# Patient Record
Sex: FEMALE | Race: BLACK OR AFRICAN AMERICAN | NOT HISPANIC OR LATINO | URBAN - METROPOLITAN AREA
[De-identification: names, ages, dates, MRNs, and addresses within clinical notes are randomized per-mention and may not be internally consistent; named-entity substitution may affect disease eponyms.]

---

## 2020-06-01 ENCOUNTER — OFFICE VISIT (OUTPATIENT)
Dept: URBAN - METROPOLITAN AREA CLINIC 86 | Facility: CLINIC | Age: 70
End: 2020-06-01

## 2020-07-25 ENCOUNTER — TELEPHONE ENCOUNTER (OUTPATIENT)
Dept: URBAN - METROPOLITAN AREA CLINIC 92 | Facility: CLINIC | Age: 70
End: 2020-07-25

## 2020-07-25 NOTE — HPI-TODAY'S VISIT:
This is a scheduled follow-up appointment for this patient, a 67 year old /Black female, after a previous visit on Sept 2017, for an evaluation for hepatitis C on treatment evaluation.  Since the last visit, the patient is asymptomatic.      Pt here for follow up. pt had flexogenix inj x 5 in the left knee since the last time we saw her. Pt occ uses the walker. she hasn't seen dr. bonds since the last visit.  she will see   8/23/17 labs wbc 4.0 hgb 13.3 plt 220 cr 0.96 tb 0.9 alp 92 ast 35 alt 20 hcv neg  EGD 6/16/17 normal esophagus bleeding erosive gastropathy  June CT chest  * Final Report *  Reason For Exam ; lymphadenopathy  REPORT PROCEDURE: CT Chest w/o Contrast  CLINICAL INDICATION: Left breast cancer  TECHNIQUE: Non-gated spiral axial images of the chest were obtained without intravenous contrast.  COMPARISON: CT chest dated 5/24/2016, MRI of the abdomen dated 2/23/2017, PET/CT dated 3/14/2014   FINDINGS:  MEDIASTINUM/HEART/VESSELS:  Normal heart size. Trace pericardial effusion. Aorta and main pulmonary artery are normal in course and caliber. Mild calcification of the coronary arteries and aorta. Increase in size of left subpectoral lymph node measuring 1.9 x 1.1 cm, previously 1.6 x 0.7 cm (series 2, image 15). Stable anterior mediastinal lymph node measuring 1.1 cm (series 2, image 25). Decrease in size of right hilar lymph node measuring 1.3 x 1.1 cm, previously 1.8 x 1.6 cm (series 2, image 45). Thyroid is unremarkable.  AIRWAY/LUNGS/PLEURA: Airways are widely patent. Stable right upper lobe groundglass opacity measuring 6 mm (series 2, image 23). No new or enlarging pulmonary nodules. Left lingula linear atelectasis. No pleural effusion or pneumothorax.  VISIBLE ABDOMEN: Multiple stable liver hypodensities, likely cyst or biliary arm at 12 months.. Stable left lower renal pole 2angiomyolipoma, please refer to most recent MRI of the abdomen for further characterization.  SOFT TISSUES/BONES: Status post left partial mastectomy changes with unchanged diffuse skin thickening. Stable left breast soft tissue nodularity with BioSorb marker, measuring 3.2 x 2.6 cm cm (series 2, image 29). No aggressive osseous lesions.   IMPRESSION: 1. Slight increase in size of left subpectoral lymphadenopathy. Attention on follow-up. 2. Stable to decrease in size of mediastinal and hilar lymphadenopathy.  3. Essentially unchanged right upper lobe groundglass opacity, stable since 3/14/2014. No new or enlarging pulmonary nodules.  These images were reviewed and interpreted by Maykel Deluca M.D.   mammogram june 2017   IMPRESSION: Stable area of post-lumpectomy and radiation change in the left breast is probably benign. A unilateral mammogram is recommended in 6 months.    Duration of visit was 25 minutes with over 50% of the time explaining the patient's condition and treatment plan.    Previous note: Pt now 3 months out from tx. she has now achieved svr. she continues to have left arm swelling and pain.  recommend she follow up with dr. bonds in regards to this since she had a LN removed from that side for her breast cancer. she also stopped her bp meds 3 months ago and bp elevated.  recommend she follow up with cardiology today as well.  will need egd with dr. shirley.  labs 5/24/17 wbc 5 hgb 13.7 plt 277 cr 0.96 tb 0.8 alp 101 ast 37 alt 23 hcv not detected   She finished [facility] 2/23/17. She has some elbow discomfort. She is following up for this.  labs 2/2017 neutrophil low 1.3, plt 184, wbc 3.8, cr 0.95, na 145-elevaetd, total bili 1.3, ast 48, alt 29, alk phos 90, hcv not detected, afp 1.3   MRI 2/23/17  Reason For Exam chronic hepatitis  REPORT MRI OF THE ABDOMEN WITHOUT AND WITH CONTRAST  CLINICAL INDICATION: Hepatitis C.  TECHNIQUE: Multisequence, multiplanar MRI of the abdomen was performed with and without intravenous contrast. Informed consent was obtained and there were no immediate complications.   CONTRAST: 10 cc of Multihance  COMPARISON: September 20, 2016 and multiple additional MRI comparisons  FINDINGS: Lung bases are prominently clear. No pleural or pericardial effusion. T2 hyperintense structure in the left breast measuring up to 2.2 cm is incompletely characterized on this MR and is grossly similar as compared to prior.  Abdomen without contrast: There is no hepatic iron or fat deposition. No ascites.   Abdomen with contrast: Mildly nodular hepatic contour. Mildly complex nonenhancing cyst in segment 8 of the liver (series 8, image 37) continues to decrease in size measuring 1.2 cm today compared to 2.0 cm previously. Numerous nonenhancing simple cysts/biliary hamartomas in both hepatic lobes are similar to prior studies. No suspicious or new hepatic lesions. Gallbladder is thin-walled. No intra or extrahepatic biliary duct dilation. Pancreas retains its intrinsic T1 hyperintense signal. 7 mm pancreatic body/tail cystic structure, likely a sidebranch IPMN, is grossly unchanged. No pancreatic parenchymal lesions or main ductal dilation. Spleen is normal in size. Subcentimeter nonenhancing focus at the superior aspect of the spleen is unchanged. Bilateral adrenal glands are normal.  Fat-containing, minimally enhancing mass arising from the left kidney is grossly unchanged measuring 3.3 x 3.4 cm (series 4A, image 61), previously 3.2 x 3.2 cm. Fat-containing right lower pole lesion measuring 1.1 cm is unchanged (series 4A, image 55) and is also compatible with an AML. Kidneys are without hydronephrosis.   Main portal vein is patent. Partially evaluated retroperitoneal collateral vessel communicates with the IVC (series 12, image 96). No intra-abdominal adenopathy.  Negative for bowel obstruction. Multilevel degenerative changes in the spine.  IMPRESSION:   1. No findings of hepatocellular cancer. Collateral vessels including in the retroperitoneum.  2. 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  These images were reviewed and interpreted by Dr. Ruthie Haile.   Signature Line *** Final ***  Electronically Signed By:  Ruthie Haile on  02/23/2017 16:03 labs February 9, 2017 that show white cell count 4.6 hemoglobin 14.2 platelet count 245 glucose 83 creatinine 1.0 sodium 141 potassium 4.0 bilirubin 0.9 alkaline phosphatase 103 AST of 49 ALT of 32. Hep C PCR was negative in AFP 1.7. Prior labs on January 26, 2017 when she was last seen showed a white count 4.5 hemoglobin 14.7 platelet count 207 creatinine 0.89 bilirubin 0.9 alk-phos 93 AST of 42 ALT of 29 hep C PCR negative at that time frame as well labs before that on January 13th showed a white count 4.0 hemoglobin 14.2 platelet count 187 creatinine 0.87 total bili 0.8 alk-phos 91 AST of 40 ALT 28 hep C PCR still positive but less than 15. Last MRI we have on her some September 20, 2016 that showed no evidence of any liver cancer. Portal vein patent. Unchanged left upper pole renal angiomyolipoma measuring up to 5.1 cm. Decreasing size of what appeared to be a seroma in the visualized portion of the left breast noted. She does have a focal pancreatic tail cystic lesion 0.3 x 1.07 cm. Should be now almost at the end of the treatment. She did however start treatment and when last seen had started possibly 3rd bottle week number 9. Thanksgiving about the time started. No side effects. No heart burn meds. January 13, 2017 approx 6 week labs. The white cell count was 4.0 hemoglobin 14.2 platelet count was 187. Glucose was 81 BUN of 12 creatinine 0.74 sodium 142 potassium 4 albumin 3.2 bilirubin 0.8 alkaline phosphatase 91 AST of 40 ALT of 28 hep C less than 15 but detected. AFP of 1.7.

## 2020-07-30 ENCOUNTER — OFFICE VISIT (OUTPATIENT)
Dept: URBAN - METROPOLITAN AREA TELEHEALTH 2 | Facility: TELEHEALTH | Age: 70
End: 2020-07-30
Payer: MEDICARE

## 2020-07-30 DIAGNOSIS — E66.9 OBESE: ICD-10-CM

## 2020-07-30 DIAGNOSIS — Z98.89 HX OF COLONOSCOPY: ICD-10-CM

## 2020-07-30 DIAGNOSIS — R94.5 ABNORMAL LIVER FUCTION TESTS: ICD-10-CM

## 2020-07-30 DIAGNOSIS — K74.69 CIRRHOSIS, CRYPTOGENIC: ICD-10-CM

## 2020-07-30 DIAGNOSIS — Z85.3 HISTORY OF BREAST CANCER: ICD-10-CM

## 2020-07-30 DIAGNOSIS — Z71.89 VACCINE COUNSELING: ICD-10-CM

## 2020-07-30 DIAGNOSIS — E66.8 OTHER OBESITY: ICD-10-CM

## 2020-07-30 DIAGNOSIS — Z79.899 HIGH RISK MEDICATION USE: ICD-10-CM

## 2020-07-30 DIAGNOSIS — K74.60 CIRRHOSIS: ICD-10-CM

## 2020-07-30 DIAGNOSIS — B18.2 CHRONIC ACTIVE HEPATITIS C: ICD-10-CM

## 2020-07-30 DIAGNOSIS — E78.5 HYPERLIPIDEMIA: ICD-10-CM

## 2020-07-30 DIAGNOSIS — I10 HYPERTENSION: ICD-10-CM

## 2020-07-30 PROCEDURE — 3017F COLORECTAL CA SCREEN DOC REV: CPT

## 2020-07-30 PROCEDURE — G9903 PT SCRN TBCO ID AS NON USER: HCPCS

## 2020-07-30 PROCEDURE — 1036F TOBACCO NON-USER: CPT

## 2020-07-30 PROCEDURE — G8417 CALC BMI ABV UP PARAM F/U: HCPCS

## 2020-07-30 PROCEDURE — 99214 OFFICE O/P EST MOD 30 MIN: CPT

## 2020-07-30 PROCEDURE — G8427 DOCREV CUR MEDS BY ELIG CLIN: HCPCS

## 2020-07-30 RX ORDER — NAPROXEN 250 MG
1 TABLET WITH FOOD OR MILK TABLET ORAL TWICE A DAY
Status: ACTIVE | COMMUNITY

## 2020-07-30 RX ORDER — AMLODIPINE BESYLATE 5 MG/1
1 TABLET TABLET ORAL ONCE A DAY
Status: ACTIVE | COMMUNITY

## 2020-07-30 RX ORDER — TRIAMTERENE AND HYDROCHLOROTHIAZIDE 37.5; 25 MG/1; MG/1
1 TABLET IN THE MORNING TABLET ORAL ONCE A DAY
Status: ACTIVE | COMMUNITY

## 2020-07-30 RX ORDER — ANTIARTHRITIC COMBINATION NO.2 900 MG
AS DIRECTED TABLET ORAL
Status: ACTIVE | COMMUNITY

## 2020-07-30 RX ORDER — TRAMADOL HYDROCHLORIDE 50 MG/1
TAKE 2 TABLETS BY ORAL ROUTE EVERY 12 HOURS AS NEEDED TABLET ORAL 2
Qty: 0 | Refills: 0 | Status: DISCONTINUED | COMMUNITY
Start: 1900-01-01

## 2020-07-30 NOTE — HPI-TODAY'S VISIT:
This is a scheduled follow-up appointment for this patient, a 70 year old /Black female, after a previous visit on 2017, for an evaluation for hepatitis C on treatment evaluation.   Since the last visit, the patient is asymptomatic.   2020 alb 4.3 amd alk 90 and ast 24 and alt 39 and tb 0.8 and bun 16 and ca 9.8 co2 21 cl 105 and cr 0.97 and glu 78 and tp 7.3 and na 144. hct 43 and hg 15 and neutrophils 1.7 and wbc 4.1 plat 203. hcv pcr neg.     ast 54 and alt 30 and alk 92 and tb 1.0  hcv pcr neg.  her labs are better but not ideal and alt 39.  To see Dr Murphy soon Aug 6 and we will try to do the u.s that day and she sees her at 140.   Pt here for follow up.   She says her knee is better as lost some weight and that helped. Lost 10 pounds.  She is walking normally now.  17 labs wbc 4.0 hgb 13.3 plt 220 cr 0.96 tb 0.9 alp 92 ast 35 alt 20 hcv neg  EGD 17 normal esophagus bleeding erosive gastropathy   CT chest  * Final Report *  Reason For Exam ; lymphadenopathy  REPORT PROCEDURE: CT Chest w/o Contrast  CLINICAL INDICATION: Left breast cancer  TECHNIQUE: Non-gated spiral axial images of the chest were obtained without intravenous contrast.  COMPARISON: CT chest dated 2016, MRI of the abdomen dated 2017, PET/CT dated 3/14/2014   FINDINGS:  MEDIASTINUM/HEART/VESSELS:  Normal heart size. Trace pericardial effusion. Aorta and main pulmonary artery are normal in course and caliber. Mild calcification of the coronary arteries and aorta. Increase in size of left subpectoral lymph node measuring 1.9 x 1.1 cm, previously 1.6 x 0.7 cm (series 2, image 15). Stable anterior mediastinal lymph node measuring 1.1 cm (series 2, image 25). Decrease in size of right hilar lymph node measuring 1.3 x 1.1 cm, previously 1.8 x 1.6 cm (series 2, image 45). Thyroid is unremarkable.  AIRWAY/LUNGS/PLEURA: Airways are widely patent. Stable right upper lobe groundglass opacity measuring 6 mm (series 2, image 23). No new or enlarging pulmonary nodules. Left lingula linear atelectasis. No pleural effusion or pneumothorax.  VISIBLE ABDOMEN: Multiple stable liver hypodensities, likely cyst or biliary arm at 12 months.. Stable left lower renal pole 2angiomyolipoma, please refer to most recent MRI of the abdomen for further characterization.  SOFT TISSUES/BONES: Status post left partial mastectomy changes with unchanged diffuse skin thickening. Stable left breast soft tissue nodularity with BioSorb marker, measuring 3.2 x 2.6 cm cm (series 2, image 29). No aggressive osseous lesions.   IMPRESSION: 1. Slight increase in size of left subpectoral lymphadenopathy. Attention on follow-up. 2. Stable to decrease in size of mediastinal and hilar lymphadenopathy.  3. Essentially unchanged right upper lobe groundglass opacity, stable since 3/14/2014. No new or enlarging pulmonary nodules.  These images were reviewed and interpreted by Maykel Deluca M.D.   mammogram 2017   IMPRESSION: Stable area of post-lumpectomy and radiation change in the left breast is probably benign. A unilateral mammogram is recommended in 6 months.    She prior had issue and  saw Dr. Murphy in regards to this since she had a LN removed from that side but no cancer.  Used to see Dr Luna retired.  17 wbc 5 hgb 13.7 plt 277 cr 0.96 tb 0.8 alp 101 ast 37 alt 23 hcv not detected   She finished [facility] 17.   labs 2017 neutrophil low 1.3, plt 184, wbc 3.8, cr 0.95, na 145-elevaetd, total bili 1.3, ast 48, alt 29, alk phos 90, hcv not detected, afp 1.3   MRI 17  Reason For Exam chronic hepatitis  REPORT MRI OF THE ABDOMEN WITHOUT AND WITH CONTRAST  CLINICAL INDICATION: Hepatitis C.  TECHNIQUE: Multisequence, multiplanar MRI of the abdomen was performed with and without intravenous contrast. Informed consent was obtained and there were no immediate complications.   CONTRAST: 10 cc of Multihance  COMPARISON: 2016 and multiple additional MRI comparisons  FINDINGS: Lung bases are prominently clear. No pleural or pericardial effusion. T2 hyperintense structure in the left breast measuring up to 2.2 cm is incompletely characterized on this MR and is grossly similar as compared to prior.  Abdomen without contrast: There is no hepatic iron or fat deposition. No ascites.   Abdomen with contrast: Mildly nodular hepatic contour. Mildly complex nonenhancing cyst in segment 8 of the liver (series 8, image 37) continues to decrease in size measuring 1.2 cm today compared to 2.0 cm previously. Numerous nonenhancing simple cysts/biliary hamartomas in both hepatic lobes are similar to prior studies. No suspicious or new hepatic lesions. Gallbladder is thin-walled. No intra or extrahepatic biliary duct dilation. Pancreas retains its intrinsic T1 hyperintense signal. 7 mm pancreatic body/tail cystic structure, likely a sidebranch IPMN, is grossly unchanged. No pancreatic parenchymal lesions or main ductal dilation. Spleen is normal in size. Subcentimeter nonenhancing focus at the superior aspect of the spleen is unchanged. Bilateral adrenal glands are normal.  Fat-containing, minimally enhancing mass arising from the left kidney is grossly unchanged measuring 3.3 x 3.4 cm (series 4A, image 61), previously 3.2 x 3.2 cm. Fat-containing right lower pole lesion measuring 1.1 cm is unchanged (series 4A, image 55) and is also compatible with an AML. Kidneys are without hydronephrosis.   Main portal vein is patent. Partially evaluated retroperitoneal collateral vessel communicates with the IVC (series 12, image 96). No intra-abdominal adenopathy.  Negative for bowel obstruction. Multilevel degenerative changes in the spine.  IMPRESSION:   1. No findings of hepatocellular cancer. Collateral vessels including in the retroperitoneum.  2. 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  These images were reviewed and interpreted by Dr. Ruthie Haile.   Signature Line *** Final ***  Electronically Signed By:  Ruthie Haile on  2017 16:03  2017 that show white cell count 4.6 hemoglobin 14.2 platelet count 245 glucose 83 creatinine 1.0 sodium 141 potassium 4.0 bilirubin 0.9 alkaline phosphatase 103 AST of 49 ALT of 32. Hep C PCR was negative in AFP 1.7.  2017 when she was last seen showed a white count 4.5 hemoglobin 14.7 platelet count 207 creatinine 0.89 bilirubin 0.9 alk-phos 93 AST of 42 ALT of 29 hep C PCR negative at that time frame as well labs before that on January 13th showed a white count 4.0 hemoglobin 14.2 platelet count 187 creatinine 0.87 total bili 0.8 alk-phos 91 AST of 40 ALT 28 hep C PCR still positive but less than 15.  MRI 2016 that showed no evidence of any liver cancer. Portal vein patent. Unchanged left upper pole renal angiomyolipoma measuring up to 5.1 cm. Decreasing size of what appeared to be a seroma in the visualized portion of the left breast noted. She does have a focal pancreatic tail cystic lesion 0.3 x 1.07 cm.   2017 approx 6 week labs. The white cell count was 4.0 hemoglobin 14.2 platelet count was 187. Glucose was 81 BUN of 12 creatinine 0.74 sodium 142 potassium 4 albumin 3.2 bilirubin 0.8 alkaline phosphatase 91 AST of 40 ALT of 28 hep C less than 15 but detected. AFP of 1.7.   Stressed to pt the need for social distancing and strict handwashing and wearing a mask and to follow any other new or added CDC recommendations as this is an evolving target.  Duration of visit was 27 minutes with over 50% of the time explaining the patient's condition and treatment plan.   Attempts were made to use real-time two-way video technology for today's encounter. Due to a technological failure or access issues, telephone technology was used in lieu of an office visit due to the current COVID-19 pandemic crisis and need for social isolation.  Patient seen today via telehealth by agreement and consent of patient in light of current COVID-19 pandemic. I used video conferencing during the visit. The patient encounter is appropriate and reasonable under the circumstances given the patient's particular presentation at this time. The patient has been advised of the followin) the potential risks and limitations of this mode of treatment (including but not limited to the absence of in-person examination); 2) the right to refuse telehealth services at any point without affecting the right to future care; 3) the right to receive in-person services, included immediately after this consultation if an urgent need arises; 4) information, including identifiable images or information from this telehealth consult, will only be shared in accordance with HIPAA regulations. Any and all of the patient's and/or patient's family member's questions on this issue have been answered. The patient has verbally consented to be treated via telehealth services. The patient has also been advised to contact this office for worsening conditions or problems, and seek emergency medical treatment and/or call 911 if the patient deems either necessary.

## 2020-08-06 ENCOUNTER — LAB OUTSIDE AN ENCOUNTER (OUTPATIENT)
Dept: URBAN - METROPOLITAN AREA TELEHEALTH 2 | Facility: TELEHEALTH | Age: 70
End: 2020-08-06

## 2020-08-09 ENCOUNTER — TELEPHONE ENCOUNTER (OUTPATIENT)
Dept: URBAN - METROPOLITAN AREA CLINIC 92 | Facility: CLINIC | Age: 70
End: 2020-08-09

## 2020-08-09 LAB
A/G RATIO: 1.4
ALBUMIN: 4.2
ALKALINE PHOSPHATASE: 89
ALT (SGPT): 27
AST (SGOT): 39
BASO (ABSOLUTE): 0
BASOS: 1
BILIRUBIN, TOTAL: 0.8
BUN/CREATININE RATIO: 19
BUN: 19
CALCIUM: 9.6
CARBON DIOXIDE, TOTAL: 24
CHLORIDE: 101
CREATININE: 0.99
EGFR IF AFRICN AM: 68
EGFR IF NONAFRICN AM: 59
EOS (ABSOLUTE): 0.3
EOS: 7
GLOBULIN, TOTAL: 3
GLUCOSE: 84
HCV GENOTYPE: (no result)
HCV LOG10: (no result)
HEMATOCRIT: 43
HEMATOLOGY COMMENTS:: (no result)
HEMOGLOBIN: 14.6
HEPATITIS C QUANTITATION: (no result)
IMMATURE CELLS: (no result)
IMMATURE GRANS (ABS): 0
IMMATURE GRANULOCYTES: 0
LYMPHS (ABSOLUTE): 1.6
LYMPHS: 41
MCH: 32.5
MCHC: 34
MCV: 96
MONOCYTES(ABSOLUTE): 0.3
MONOCYTES: 8
NEUTROPHILS (ABSOLUTE): 1.7
NEUTROPHILS: 43
NRBC: (no result)
PLATELETS: 228
POTASSIUM: 4
PROTEIN, TOTAL: 7.2
RBC: 4.49
RDW: 13.2
SODIUM: 139
TEST INFORMATION:: (no result)
WBC: 3.9

## 2020-08-09 NOTE — HPI-TODAY'S VISIT:
Dear Aidee Amato The results of your recent tests are explained below: wbc 3.9 hg 14.6 plat 228. mcv 96. glu 84 and bun 19 and cr 0.99 and na 139 and k 4.0 and cl 101 and co2 24 and ca 9.6 and alb 4.2 and tb 0.8 and alk 89 and ast 39 and alt 27. Ideal alt <25 so very close but in normal range.  HCV pcr remains negative. Now need that imaging also to correlate to this as you had not done that in while. Glad to see labs stable and share with local doctors.

## 2020-08-12 ENCOUNTER — TELEPHONE ENCOUNTER (OUTPATIENT)
Dept: URBAN - METROPOLITAN AREA CLINIC 92 | Facility: CLINIC | Age: 70
End: 2020-08-12

## 2020-08-12 NOTE — HPI-OTHER HISTORIES
Dear Aidee, Local [place] cancer labs sent in: Aug 5: Ast 47 and alt 38.  Alk 88. Alb 4.2.  Our labs ast 39 and alt 27 done almost same time frame.   Dr Wade

## 2021-01-25 ENCOUNTER — LAB OUTSIDE AN ENCOUNTER (OUTPATIENT)
Dept: URBAN - METROPOLITAN AREA TELEHEALTH 2 | Facility: TELEHEALTH | Age: 71
End: 2021-01-25

## 2021-01-26 ENCOUNTER — OFFICE VISIT (OUTPATIENT)
Dept: URBAN - METROPOLITAN AREA TELEHEALTH 2 | Facility: TELEHEALTH | Age: 71
End: 2021-01-26
Payer: MEDICARE

## 2021-01-26 ENCOUNTER — LAB OUTSIDE AN ENCOUNTER (OUTPATIENT)
Dept: URBAN - METROPOLITAN AREA TELEHEALTH 2 | Facility: TELEHEALTH | Age: 71
End: 2021-01-26

## 2021-01-26 ENCOUNTER — OFFICE VISIT (OUTPATIENT)
Dept: URBAN - METROPOLITAN AREA CLINIC 91 | Facility: CLINIC | Age: 71
End: 2021-01-26

## 2021-01-26 DIAGNOSIS — Z98.89 HX OF COLONOSCOPY: ICD-10-CM

## 2021-01-26 DIAGNOSIS — E66.8 OTHER OBESITY: ICD-10-CM

## 2021-01-26 DIAGNOSIS — K74.69 CIRRHOSIS, CRYPTOGENIC: ICD-10-CM

## 2021-01-26 DIAGNOSIS — E78.5 HYPERLIPIDEMIA: ICD-10-CM

## 2021-01-26 DIAGNOSIS — Z71.89 VACCINE COUNSELING: ICD-10-CM

## 2021-01-26 DIAGNOSIS — Z85.3 HISTORY OF BREAST CANCER: ICD-10-CM

## 2021-01-26 DIAGNOSIS — Z79.899 HIGH RISK MEDICATION USE: ICD-10-CM

## 2021-01-26 DIAGNOSIS — I10 HYPERTENSION: ICD-10-CM

## 2021-01-26 DIAGNOSIS — E66.9 OBESE: ICD-10-CM

## 2021-01-26 DIAGNOSIS — K74.60 CIRRHOSIS: ICD-10-CM

## 2021-01-26 DIAGNOSIS — R94.5 ABNORMAL LIVER FUCTION TESTS: ICD-10-CM

## 2021-01-26 DIAGNOSIS — B18.2 CHRONIC ACTIVE HEPATITIS C: ICD-10-CM

## 2021-01-26 PROCEDURE — G8483 FLU IMM NO ADMIN DOC REA: HCPCS

## 2021-01-26 PROCEDURE — G9903 PT SCRN TBCO ID AS NON USER: HCPCS

## 2021-01-26 PROCEDURE — G8417 CALC BMI ABV UP PARAM F/U: HCPCS

## 2021-01-26 PROCEDURE — 1036F TOBACCO NON-USER: CPT

## 2021-01-26 PROCEDURE — 3017F COLORECTAL CA SCREEN DOC REV: CPT

## 2021-01-26 PROCEDURE — 99214 OFFICE O/P EST MOD 30 MIN: CPT

## 2021-01-26 PROCEDURE — G8427 DOCREV CUR MEDS BY ELIG CLIN: HCPCS

## 2021-01-26 RX ORDER — ANTIARTHRITIC COMBINATION NO.2 900 MG
AS DIRECTED TABLET ORAL
Status: ACTIVE | COMMUNITY

## 2021-01-26 RX ORDER — TRIAMTERENE AND HYDROCHLOROTHIAZIDE 37.5; 25 MG/1; MG/1
1 TABLET IN THE MORNING TABLET ORAL ONCE A DAY
Status: ACTIVE | COMMUNITY

## 2021-01-26 RX ORDER — AMLODIPINE BESYLATE 5 MG/1
1 TABLET TABLET ORAL ONCE A DAY
Status: ACTIVE | COMMUNITY

## 2021-01-26 RX ORDER — NAPROXEN 250 MG
1 TABLET WITH FOOD OR MILK TABLET ORAL
Status: ACTIVE | COMMUNITY

## 2021-01-26 NOTE — HPI-TODAY'S VISIT:
This is a scheduled follow-up appointment for this patient, a 70 year old /Black female, after a previous visit on July 2020 , for an evaluation for hepatitis C on treatment evaluation.   Since the last visit, the patient is asymptomatic.   Local [place] labs sent in: Aug 5 20: Ast 47 and alt 38.  Alk 88. Alb 4.2.  Our labs ast 39 and alt 27 done almost same time frame.   No new labs since.  She sees Dr Murphy in feb or march.  Aug 2020 wbc 3.9 hg 14.6 plat 228. mcv 96. glu 84 and bun 19 and cr 0.99 and na 139 and k 4.0 and cl 101 and co2 24 and ca 9.6 and alb 4.2 and tb 0.8 and alk 89 and ast 39 and alt 27. Ideal alt <25 so very close but in normal range.  HCV pcr remains negative.  We need those with her imaging also to correlate to this as you had not done that in while. Glad to see labs stable and share with local doctors.  She lives close and could do that.  She has not done the covid 19 vaccine.   March 2020 alb 4.3 amd alk 90 and ast 24 and alt 39 and tb 0.8 and bun 16 and ca 9.8 co2 21 cl 105 and cr 0.97 and glu 78 and tp 7.3 and na 144. hct 43 and hg 15 and neutrophils 1.7 and wbc 4.1 plat 203. hcv pcr neg.   2019  ast 54 and alt 30 and alk 92 and tb 1.0  hcv pcr neg.  Prior tried to do the u.s.   She says she has not lost weight and gained 10 pounds. Last time she had lost 10 pounds.   She is walking normally now.  8/23/17 labs wbc 4.0 hgb 13.3 plt 220 cr 0.96 tb 0.9 alp 92 ast 35 alt 20 hcv neg  EGD 6/16/17 normal esophagus bleeding erosive gastropathy  June CT chest  * Final Report *  Reason For Exam ; lymphadenopathy  REPORT PROCEDURE: CT Chest w/o Contrast  CLINICAL INDICATION: Left breast cancer  TECHNIQUE: Non-gated spiral axial images of the chest were obtained without intravenous contrast.  COMPARISON: CT chest dated 5/24/2016, MRI of the abdomen dated 2/23/2017, PET/CT dated 3/14/2014   FINDINGS:  MEDIASTINUM/HEART/VESSELS:  Normal heart size. Trace pericardial effusion. Aorta and main pulmonary artery are normal in course and caliber. Mild calcification of the coronary arteries and aorta. Increase in size of left subpectoral lymph node measuring 1.9 x 1.1 cm, previously 1.6 x 0.7 cm (series 2, image 15). Stable anterior mediastinal lymph node measuring 1.1 cm (series 2, image 25). Decrease in size of right hilar lymph node measuring 1.3 x 1.1 cm, previously 1.8 x 1.6 cm (series 2, image 45). Thyroid is unremarkable.  AIRWAY/LUNGS/PLEURA: Airways are widely patent. Stable right upper lobe groundglass opacity measuring 6 mm (series 2, image 23). No new or enlarging pulmonary nodules. Left lingula linear atelectasis. No pleural effusion or pneumothorax.  VISIBLE ABDOMEN: Multiple stable liver hypodensities, likely cyst or biliary arm at 12 months.. Stable left lower renal pole angiomyolipoma, please refer to most recent MRI of the abdomen for further characterization.  SOFT TISSUES/BONES: Status post left partial mastectomy changes with unchanged diffuse skin thickening. Stable left breast soft tissue nodularity with BioSorb marker, measuring 3.2 x 2.6 cm cm (series 2, image 29). No aggressive osseous lesions.   IMPRESSION: 1. Slight increase in size of left subpectoral lymphadenopathy. Attention on follow-up. 2. Stable to decrease in size of mediastinal and hilar lymphadenopathy.  3. Essentially unchanged right upper lobe groundglass opacity, stable since 3/14/2014. No new or enlarging pulmonary nodules.  These images were reviewed and interpreted by Maykel Deluca M.D.   mammogram june 2017   IMPRESSION: Stable area of post-lumpectomy and radiation change in the left breast is probably benign. A unilateral mammogram is recommended in 6 months.    She prior had issue and  saw Dr. Murphy in regards to this since she had a LN removed from that side but no cancer.  Used to see Dr Luna retired.  5/24/17 wbc 5 hgb 13.7 plt 277 cr 0.96 tb 0.8 alp 101 ast 37 alt 23 hcv not detected   She finished Harvoni 2/23/17.   2/2017 neutrophil low 1.3, plt 184, wbc 3.8, cr 0.95, na 145-elevaetd, total bili 1.3, ast 48, alt 29, alk phos 90, hcv not detected, afp 1.3   MRI 2/23/17  Reason For Exam chronic hepatitis  REPORT MRI OF THE ABDOMEN WITHOUT AND WITH CONTRAST  CLINICAL INDICATION: Hepatitis C.  TECHNIQUE: Multisequence, multiplanar MRI of the abdomen was performed with and without intravenous contrast. Informed consent was obtained and there were no immediate complications.   CONTRAST: 10 cc of Multihance  COMPARISON: September 20, 2016 and multiple additional MRI comparisons  FINDINGS: Lung bases are prominently clear. No pleural or pericardial effusion. T2 hyperintense structure in the left breast measuring up to 2.2 cm is incompletely characterized on this MR and is grossly similar as compared to prior.  Abdomen without contrast: There is no hepatic iron or fat deposition. No ascites.   Abdomen with contrast: Mildly nodular hepatic contour. Mildly complex nonenhancing cyst in segment 8 of the liver (series 8, image 37) continues to decrease in size measuring 1.2 cm today compared to 2.0 cm previously. Numerous nonenhancing simple cysts/biliary hamartomas in both hepatic lobes are similar to prior studies. No suspicious or new hepatic lesions. Gallbladder is thin-walled. No intra or extrahepatic biliary duct dilation. Pancreas retains its intrinsic T1 hyperintense signal. 7 mm pancreatic body/tail cystic structure, likely a sidebranch IPMN, is grossly unchanged. No pancreatic parenchymal lesions or main ductal dilation. Spleen is normal in size. Subcentimeter nonenhancing focus at the superior aspect of the spleen is unchanged. Bilateral adrenal glands are normal.  Fat-containing, minimally enhancing mass arising from the left kidney is grossly unchanged measuring 3.3 x 3.4 cm (series 4A, image 61), previously 3.2 x 3.2 cm. Fat-containing right lower pole lesion measuring 1.1 cm is unchanged (series 4A, image 55) and is also compatible with an AML. Kidneys are without hydronephrosis.   Main portal vein is patent. Partially evaluated retroperitoneal collateral vessel communicates with the IVC (series 12, image 96). No intra-abdominal adenopathy.  Negative for bowel obstruction. Multilevel degenerative changes in the spine.  IMPRESSION:   1. No findings of hepatocellular cancer. Collateral vessels including in the retroperitoneum.  2. 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  These images were reviewed and interpreted by Dr. Ruthie Haile.   Signature Line *** Final ***  Electronically Signed By:  Ruthie Haile on  02/23/2017 16:03  February 9, 2017 that show white cell count 4.6 hemoglobin 14.2 platelet count 245 glucose 83 creatinine 1.0 sodium 141 potassium 4.0 bilirubin 0.9 alkaline phosphatase 103 AST of 49 ALT of 32. Hep C PCR was negative in AFP 1.7.  January 26, 2017 when she was last seen showed a white count 4.5 hemoglobin 14.7 platelet count 207 creatinine 0.89 bilirubin 0.9 alk-phos 93 AST of 42 ALT of 29 hep C PCR negative at that time frame as well labs before that on January 13th showed a white count 4.0 hemoglobin 14.2 platelet count 187 creatinine 0.87 total bili 0.8 alk-phos 91 AST of 40 ALT 28 hep C PCR still positive but less than 15.  MRI September 20, 2016 that showed no evidence of any liver cancer. Portal vein patent. Unchanged left upper pole renal angiomyolipoma measuring up to 5.1 cm. Decreasing size of what appeared to be a seroma in the visualized portion of the left breast noted. She does have a focal pancreatic tail cystic lesion 0.3 x 1.07 cm.   January 13, 2017 approx 6 week labs. The white cell count was 4.0 hemoglobin 14.2 platelet count was 187. Glucose was 81 BUN of 12 creatinine 0.74 sodium 142 potassium 4 albumin 3.2 bilirubin 0.8 alkaline phosphatase 91 AST of 40 ALT of 28 hep C less than 15 but detected. AFP of 1.7.   Stressed to pt the need for social distancing and strict handwashing and wearing a mask and to follow any other new or added CDC recommendations as this is an evolving target.  plan: 1. Do the u.s when can. 2. She says she is due for colonoscopy and DR Luna retired and refer to Dr Hui. 3. She will do that day also 4. She wants to do the u.s and labs and see DrGordon same day.  Duration of visit was  30 min via Veeip video from 2:20 to 2:40 pm with over 50% of the time explaining the patient's condition and treatment plan.

## 2021-02-04 ENCOUNTER — OFFICE VISIT (OUTPATIENT)
Dept: URBAN - METROPOLITAN AREA CLINIC 91 | Facility: CLINIC | Age: 71
End: 2021-02-04

## 2021-02-06 ENCOUNTER — LAB OUTSIDE AN ENCOUNTER (OUTPATIENT)
Dept: URBAN - METROPOLITAN AREA TELEHEALTH 2 | Facility: TELEHEALTH | Age: 71
End: 2021-02-06

## 2021-06-29 ENCOUNTER — WEB ENCOUNTER (OUTPATIENT)
Dept: URBAN - METROPOLITAN AREA CLINIC 92 | Facility: CLINIC | Age: 71
End: 2021-06-29

## 2021-06-29 ENCOUNTER — OFFICE VISIT (OUTPATIENT)
Dept: URBAN - METROPOLITAN AREA CLINIC 92 | Facility: CLINIC | Age: 71
End: 2021-06-29
Payer: MEDICARE

## 2021-06-29 DIAGNOSIS — Z86.010 PERSONAL HISTORY OF COLON POLYPS: ICD-10-CM

## 2021-06-29 DIAGNOSIS — Z12.11 COLON CANCER SCREENING: ICD-10-CM

## 2021-06-29 PROCEDURE — 99214 OFFICE O/P EST MOD 30 MIN: CPT | Performed by: INTERNAL MEDICINE

## 2021-06-29 RX ORDER — AMLODIPINE BESYLATE 5 MG/1
1 TABLET TABLET ORAL ONCE A DAY
Status: ACTIVE | COMMUNITY

## 2021-06-29 RX ORDER — NAPROXEN 250 MG
1 TABLET WITH FOOD OR MILK TABLET ORAL
Status: ACTIVE | COMMUNITY

## 2021-06-29 RX ORDER — ANTIARTHRITIC COMBINATION NO.2 900 MG
AS DIRECTED TABLET ORAL
Status: ACTIVE | COMMUNITY

## 2021-06-29 RX ORDER — TRIAMTERENE AND HYDROCHLOROTHIAZIDE 37.5; 25 MG/1; MG/1
1 TABLET IN THE MORNING TABLET ORAL ONCE A DAY
Status: ACTIVE | COMMUNITY

## 2021-06-29 RX ORDER — SODIUM, POTASSIUM,MAG SULFATES 17.5-3.13G
177 ML SOLUTION, RECONSTITUTED, ORAL ORAL
Qty: 177 MILLIMETER | Refills: 0 | OUTPATIENT
Start: 2021-06-29 | End: 2021-06-30

## 2021-06-29 NOTE — HPI-TODAY'S VISIT:
This is a 70 yo female with a history of breast cancer referred by Dr. Wilber Wade for a colonoscopy.  A copy of this document will be sent to the referring provider.  A colonoscopy performed by Dr. Collin Parrish 2016 revealed 2 precancerous colon polyps.   The patient denies abdominal pain, weight loss, rectal bleeding, constipation, diarrhea, and a change in bowel habits.  There is no family history of colon cancer or colon polyps.

## 2021-07-26 ENCOUNTER — LAB OUTSIDE AN ENCOUNTER (OUTPATIENT)
Dept: URBAN - METROPOLITAN AREA TELEHEALTH 2 | Facility: TELEHEALTH | Age: 71
End: 2021-07-26

## 2021-07-27 ENCOUNTER — TELEPHONE ENCOUNTER (OUTPATIENT)
Dept: URBAN - METROPOLITAN AREA CLINIC 86 | Facility: CLINIC | Age: 71
End: 2021-07-27

## 2021-07-29 ENCOUNTER — OFFICE VISIT (OUTPATIENT)
Dept: URBAN - METROPOLITAN AREA CLINIC 91 | Facility: CLINIC | Age: 71
End: 2021-07-29
Payer: MEDICARE

## 2021-07-29 ENCOUNTER — DASHBOARD ENCOUNTERS (OUTPATIENT)
Age: 71
End: 2021-07-29

## 2021-07-29 ENCOUNTER — OFFICE VISIT (OUTPATIENT)
Dept: URBAN - METROPOLITAN AREA CLINIC 86 | Facility: CLINIC | Age: 71
End: 2021-07-29
Payer: MEDICARE

## 2021-07-29 DIAGNOSIS — Z71.89 VACCINE COUNSELING: ICD-10-CM

## 2021-07-29 DIAGNOSIS — K74.60 CIRRHOSIS: ICD-10-CM

## 2021-07-29 DIAGNOSIS — Z98.89 HX OF COLONOSCOPY: ICD-10-CM

## 2021-07-29 DIAGNOSIS — Z85.3 HISTORY OF BREAST CANCER: ICD-10-CM

## 2021-07-29 DIAGNOSIS — E66.9 OBESE: ICD-10-CM

## 2021-07-29 DIAGNOSIS — B18.2 CHRONIC HEPATITIS C: ICD-10-CM

## 2021-07-29 DIAGNOSIS — I10 HYPERTENSION: ICD-10-CM

## 2021-07-29 DIAGNOSIS — K74.69 OTHER CIRRHOSIS OF LIVER: ICD-10-CM

## 2021-07-29 DIAGNOSIS — Z79.899 HIGH RISK MEDICATION USE: ICD-10-CM

## 2021-07-29 DIAGNOSIS — R94.5 ABNORMAL LIVER FUCTION TESTS: ICD-10-CM

## 2021-07-29 DIAGNOSIS — D49.0 IPMN (INTRADUCTAL PAPILLARY MUCINOUS NEOPLASM): ICD-10-CM

## 2021-07-29 DIAGNOSIS — B18.2 CHRONIC ACTIVE HEPATITIS C: ICD-10-CM

## 2021-07-29 DIAGNOSIS — K76.89 LIVER CYSTS: ICD-10-CM

## 2021-07-29 DIAGNOSIS — E78.5 HYPERLIPIDEMIA: ICD-10-CM

## 2021-07-29 PROBLEM — 428283002 HISTORY OF POLYP OF COLON: Status: ACTIVE | Noted: 2021-06-29

## 2021-07-29 PROCEDURE — 93975 VASCULAR STUDY: CPT

## 2021-07-29 PROCEDURE — 99214 OFFICE O/P EST MOD 30 MIN: CPT

## 2021-07-29 PROCEDURE — 76705 ECHO EXAM OF ABDOMEN: CPT

## 2021-07-29 RX ORDER — ANTIARTHRITIC COMBINATION NO.2 900 MG
AS DIRECTED TABLET ORAL
Status: DISCONTINUED | COMMUNITY

## 2021-07-29 RX ORDER — NAPROXEN 250 MG
1 TABLET WITH FOOD OR MILK TABLET ORAL
Status: DISCONTINUED | COMMUNITY

## 2021-07-29 RX ORDER — AMLODIPINE BESYLATE 5 MG/1
1 TABLET TABLET ORAL ONCE A DAY
Status: ACTIVE | COMMUNITY

## 2021-07-29 RX ORDER — ASPIRIN 325 MG
2 CAPSULE TABLET ORAL ONCE A DAY
Status: ACTIVE | COMMUNITY

## 2021-07-29 RX ORDER — TRIAMTERENE AND HYDROCHLOROTHIAZIDE 37.5; 25 MG/1; MG/1
1 TABLET IN THE MORNING TABLET ORAL ONCE A DAY
Status: ACTIVE | COMMUNITY

## 2021-07-29 RX ORDER — ASCORBIC ACID 1000 MG
1 CAPSULE TABLET ORAL ONCE A DAY
Status: ACTIVE | COMMUNITY

## 2021-07-29 RX ORDER — ANTIARTHRITIC COMBINATION NO.2 900 MG
AS DIRECTED TABLET ORAL
Status: ACTIVE | COMMUNITY

## 2021-07-29 RX ORDER — NAPROXEN 250 MG
1 TABLET WITH FOOD OR MILK TABLET ORAL
Status: ACTIVE | COMMUNITY

## 2021-07-29 NOTE — HPI-TODAY'S VISIT:
This is a scheduled follow-up appointment for this patient, a 71 year old /Black female, after a previous visit on July 2020 , for an evaluation for hepatitis C post treatment evaluation.   Since the last visit, the patient is asymptomatic.   Did labs yesterday with primary md and need to get those.  Did the u.s today.  Recent:  Document Type:	PET CT Skull Base to Mid Thigh Document Date:	June 28, 2021 09:13 EDT Document Status:	Auth (Verified) Document Title:	PET CT Skull Base to Mid Thigh Performed By:	Dennis Lynn  Verified By:	Francisco Baer on June 28, 2021 11:34 EDT Encounter info:	[id number], [facility], Single Visit OP, 6/28/2021 - 6/28/2021  * Final Report *  Reason For Exam Personal history of malignant neoplasm of breast  REPORT EXAM: PET CT Skull Base to Mid Thigh  CLINICAL INDICATION: Breast cancer with rising serum biomarkers. T2 N0 triple negative left breast cancer, initially diagnosed in 2014 status post neoadjuvant chemotherapy, followed by left partial mastectomy and adjuvant radiation therapy.  FDG PET/CT for subsequent treatment strategy.  TECHNIQUE: The patient received an IV injection of 14.4 mCi 18F-FDG in the right antecubital fossa.  After an initial uptake phase of approximately 60-90 minutes, a CT scan with oral contrast, without IV contrast was acquired.  Subsequently, positron emission tomography images from the skull base to thigh were obtained.  CT, PET and fused images were reconstructed in transaxial, coronal, and sagittal projections and interpreted from a workstation.  The patient's plasma glucose was 84 mg/dL. [id number]  COMPARISON: CT chest dated 10/17/2018  FINDINGS:   In the head and neck, asymmetric radiotracer activity is noted in the left tongue base with an SUV max of 5.6 (axial CT image 23). This appears to extend to the vallecula. No FDG avid cervical lymphadenopathy. A slightly prominent right supraclavicular lymph node is noted measuring 1.1 x 0.9 cm (axial CT image 48). The thyroid gland appears unremarkable.  In the chest, FDG avid AP window, bilateral hilar and subcarinal lymph nodes are noted. For example, the AP window lymph node measures approximately 1.8 x 0.8 cm with an SUV max of 5.0 (axial CT image 64). The subcarinal lymph node measures approximately 1.4 x 0.8 cm with an SUV max of 5.0 (axial CT image 81).  The lungs demonstrate no FDG avid consolidations or pulmonary nodules. Stable scattered small pulmonary nodules, which are below the resolution of PET. For example, a 2 mm pulmonary nodule is noted in the right upper lobe (axial CT image 67). The central airways are patent. Heart size is within normal limits. No pericardial or pleural effusions. Aortic valvular and mitral annular calcifications are noted.  Postoperative changes are noted in the left breast with soft tissue thickening, likely related to scarring. No FDG avid discrete soft tissue mass is identified.  In the abdomen and pelvis, physiologic radiotracer activity is noted in the liver. Multiple hypodensities are noted in the liver bilaterally, which demonstrates no significant radiotracer uptake. However, these are too small to characterize. The gallbladder appears unremarkable. The adrenal glands demonstrate no FDG avid nodules or masses. The kidneys demonstrate no hydronephrosis. A fat-containing left upper lobe mass is noted measuring 6.1 x 4.1 cm (axial CT image 113). A smaller subcentimeter hypodense lesion is noted arising from the right lower pole, which is too small to characterize. The spleen appears unremarkable. A soft tissue nodules adjacent to the spleen, likely represents splenule. The pancreas is within normal limits. The stomach is decompressed, which limits evaluation. The small bowel and large bowel are of normal caliber without abnormal dilatation. The urinary bladder is partially decompressed, which limits evaluation. The uterus is not identified. No significant free fluid or free air. Abdominal aorta is of normal caliber. Mild atherosclerotic changes noted in the abdominal aorta and its branches.  Mildly FDG avid periportal lymphadenopathy is noted. For example, a portacaval lymph node measuring 1.5 x 0.5 cm demonstrates an SUV max of 4.3 (axial CT image 119).  In the visualized extremities, no FDG avid soft tissue mass is identified.  In the osseous structures, physiological tracer uptake is present. Diffuse decreased bone mineralization and multilevel degenerative changes are noted. No FDG avid osseous lesions are identified.  IMPRESSION:  FDG PET/CT from the skull base to thigh demonstrates:   1.  FDG avid mediastinal and hilar lymphadenopathy are noted, which may represent metastatic disease versus inflammatory radiotracer uptake.  2.  Mildly FDG avid periportal lymphadenopathy, nonspecific. Attention on follow-up is suggested.  3.  Stable postoperative changes in the left breast without evidence of FDG avid local recurrence.  4.  Stable left renal angiomyolipoma, grossly unchanged in appearance since 2018.  5.  Asymmetric radiotracer uptake in the left tongue base which may simply represent variable lingual tonsillar tissue among other etiologies. Correlation with direct visualization is suggested.  6.  A slightly prominent right supraclavicular lymph node is noted without significant associated metabolic activity, nonspecific. Attention on follow-up is suggested.  7.  Other incidental findings, as above.      [place] [address] [place] [address] [phone]  The images were reviewed and interpreted by Francisco Baer MD.  Signature Line *** Final ***  Electronically Signed By:  Francisco Baer on  06/28/2021 11:34  Dictated by:  Dennis Lynn   Document Type:	US Abdomen Complete Document Date:	March 12, 2020 08:34 EDT Document Status:	Auth (Verified) Document Title:	US Abdomen Complete Performed By:	Bertha Sutton  Verified By:	Bertha Sutton on March 12, 2020 10:20 EDT Encounter info:	[id number], [facility], Single Visit OP, 3/12/2020 - 3/12/2020  * Final Report *  Reason For Exam CIRRHOSIS  REPORT EXAM: US Abdomen Complete, US Abdomen Doppler Complete  CLINICAL INDICATION: CIRRHOSIS.  TECHNIQUE: Grayscale, pulsed wave and color Doppler sonography of the upper abdomen were performed. [id number]  COMPARISON: MRI 10/29/2017  FINDINGS:  Liver: Coarsened echotexture. Scattered hepatic cysts measuring up to 1.6 cm. No suspicious lesions.  Bile Ducts: No dilated intrahepatic biliary radicles. Common duct measures 7 mm.  Gallbladder: Normal. Burciaga's sign is negative.  Pancreas: Normal.  Right Kidney: Measures 11.4 cm. Normal echogenicity. No hydronephrosis.  Left Kidney: Measures 8.1 cm. Normal echogenicity. No hydronephrosis.  Spleen: Measures 8.3 cm.  Aorta: Normal caliber where imaged.  IVC: Normal proximally, not imaged distally.   Hepatic Veins: Normal.  Portal Veins: Hepatopetal flow. 19.1 cm/s.  Hepatic Arteries: Peak systolic velocity 47 cm/s. Resistive index 0.7.  Other: None.  IMPRESSION: Coarsened hepatic echotexture suggestive of chronic liver disease. No definite suspicious lesions. Patent hepatic vasculature with correct directional flow.   Signature Line *** Final ***  Electronically Signed By:  Bertha Sutton on  03/12/2020 10:20  Dictated by:  Bertha Sutton  Stressed the need for imaging.  [place] labs sent in: Aug 5 20: Ast 47 and alt 38.  Alk 88. Alb 4.2.  Our labs ast 39 and alt 27 done almost same time frame.   Aug 2020 wbc 3.9 hg 14.6 plat 228. mcv 96. glu 84 and bun 19 and cr 0.99 and na 139 and k 4.0 and cl 101 and co2 24 and ca 9.6 and alb 4.2 and tb 0.8 and alk 89 and ast 39 and alt 27.   HCV pcr remains negative.  She has not done the covid 19 vaccine and does not plan to do.   March 2020 alb 4.3 amd alk 90 and ast 24 and alt 39 and tb 0.8 and bun 16 and ca 9.8 co2 21 cl 105 and cr 0.97 and glu 78 and tp 7.3 and na 144. hct 43 and hg 15 and neutrophils 1.7 and wbc 4.1 plat 203. hcv pcr neg.   2019  ast 54 and alt 30 and alk 92 and tb 1.0  hcv pcr neg.  Did do the u.s.  She has gained some weight 7 pounds.    Many people trying to walk to lose weight and follow this.   8/23/17 labs wbc 4.0 hgb 13.3 plt 220 cr 0.96 tb 0.9 alp 92 ast 35 alt 20 hcv neg  EGD 6/16/17 normal esophagus and had bleeding erosive gastropathy  June CT chest  * Final Report *  Reason For Exam ; lymphadenopathy  REPORT PROCEDURE: CT Chest w/o Contrast  CLINICAL INDICATION: Left breast cancer  TECHNIQUE: Non-gated spiral axial images of the chest were obtained without intravenous contrast.  COMPARISON: CT chest dated 5/24/2016, MRI of the abdomen dated 2/23/2017, PET/CT dated 3/14/2014   FINDINGS:  MEDIASTINUM/HEART/VESSELS:  Normal heart size. Trace pericardial effusion. Aorta and main pulmonary artery are normal in course and caliber. Mild calcification of the coronary arteries and aorta. Increase in size of left subpectoral lymph node measuring 1.9 x 1.1 cm, previously 1.6 x 0.7 cm (series 2, image 15). Stable anterior mediastinal lymph node measuring 1.1 cm (series 2, image 25). Decrease in size of right hilar lymph node measuring 1.3 x 1.1 cm, previously 1.8 x 1.6 cm (series 2, image 45). Thyroid is unremarkable.  AIRWAY/LUNGS/PLEURA: Airways are widely patent. Stable right upper lobe groundglass opacity measuring 6 mm (series 2, image 23). No new or enlarging pulmonary nodules. Left lingula linear atelectasis. No pleural effusion or pneumothorax.  VISIBLE ABDOMEN: Multiple stable liver hypodensities, likely cyst or biliary arm at 12 months.. Stable left lower renal pole angiomyolipoma, please refer to most recent MRI of the abdomen for further characterization.  SOFT TISSUES/BONES: Status post left partial mastectomy changes with unchanged diffuse skin thickening. Stable left breast soft tissue nodularity with BioSorb marker, measuring 3.2 x 2.6 cm cm (series 2, image 29). No aggressive osseous lesions.   IMPRESSION: 1. Slight increase in size of left subpectoral lymphadenopathy. Attention on follow-up. 2. Stable to decrease in size of mediastinal and hilar lymphadenopathy.  3. Essentially unchanged right upper lobe groundglass opacity, stable since 3/14/2014. No new or enlarging pulmonary nodules.  These images were reviewed and interpreted by Maykel Deluca M.D.   mammogram june 2017   IMPRESSION: Stable area of post-lumpectomy and radiation change in the left breast is probably benign. A unilateral mammogram is recommended in 6 months.    She prior had issue and  saw Dr. Murphy in regards to this since she had a LN removed from that side but no cancer.  Used to see Dr Luna retired.  5/24/17 wbc 5 hgb 13.7 plt 277 cr 0.96 tb 0.8 alp 101 ast 37 alt 23 hcv not detected   She finished Harvoni 2/23/17.   2/2017 neutrophil low 1.3, plt 184, wbc 3.8, cr 0.95, na 145-elevaetd, total bili 1.3, ast 48, alt 29, alk phos 90, hcv not detected, afp 1.3   MRI 2/23/17  Reason For Exam chronic hepatitis  REPORT MRI OF THE ABDOMEN WITHOUT AND WITH CONTRAST  CLINICAL INDICATION: Hepatitis C.  TECHNIQUE: Multisequence, multiplanar MRI of the abdomen was performed with and without intravenous contrast. Informed consent was obtained and there were no immediate complications.   CONTRAST: 10 cc of Multihance  COMPARISON: September 20, 2016 and multiple additional MRI comparisons  FINDINGS: Lung bases are prominently clear. No pleural or pericardial effusion. T2 hyperintense structure in the left breast measuring up to 2.2 cm is incompletely characterized on this MR and is grossly similar as compared to prior.  Abdomen without contrast: There is no hepatic iron or fat deposition. No ascites.   Abdomen with contrast: Mildly nodular hepatic contour. Mildly complex nonenhancing cyst in segment 8 of the liver (series 8, image 37) continues to decrease in size measuring 1.2 cm today compared to 2.0 cm previously. Numerous nonenhancing simple cysts/biliary hamartomas in both hepatic lobes are similar to prior studies. No suspicious or new hepatic lesions. Gallbladder is thin-walled. No intra or extrahepatic biliary duct dilation. Pancreas retains its intrinsic T1 hyperintense signal. 7 mm pancreatic body/tail cystic structure, likely a sidebranch IPMN, is grossly unchanged. No pancreatic parenchymal lesions or main ductal dilation. Spleen is normal in size. Subcentimeter nonenhancing focus at the superior aspect of the spleen is unchanged. Bilateral adrenal glands are normal.  Fat-containing, minimally enhancing mass arising from the left kidney is grossly unchanged measuring 3.3 x 3.4 cm (series 4A, image 61), previously 3.2 x 3.2 cm. Fat-containing right lower pole lesion measuring 1.1 cm is unchanged (series 4A, image 55) and is also compatible with an AML. Kidneys are without hydronephrosis.   Main portal vein is patent. Partially evaluated retroperitoneal collateral vessel communicates with the IVC (series 12, image 96). No intra-abdominal adenopathy.  Negative for bowel obstruction. Multilevel degenerative changes in the spine.  IMPRESSION:   1. No findings of hepatocellular cancer. Collateral vessels including in the retroperitoneum.  2. 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  These images were reviewed and interpreted by Dr. Ruthie Haile.   Signature Line *** Final ***  Electronically Signed By:  Ruthie Haile on  02/23/2017 16:03  February 9, 2017 that show white cell count 4.6 hemoglobin 14.2 platelet count 245 glucose 83 creatinine 1.0 sodium 141 potassium 4.0 bilirubin 0.9 alkaline phosphatase 103 AST of 49 ALT of 32. Hep C PCR was negative in AFP 1.7.  January 26, 2017 when she was last seen showed a white count 4.5 hemoglobin 14.7 platelet count 207 creatinine 0.89 bilirubin 0.9 alk-phos 93 AST of 42 ALT of 29 hep C PCR negative at that time frame as well labs before that on January 13th showed a white count 4.0 hemoglobin 14.2 platelet count 187 creatinine 0.87 total bili 0.8 alk-phos 91 AST of 40 ALT 28 hep C PCR still positive but less than 15.  MRI September 20, 2016 that showed no evidence of any liver cancer. Portal vein patent. Unchanged left upper pole renal angiomyolipoma measuring up to 5.1 cm. Decreasing size of what appeared to be a seroma in the visualized portion of the left breast noted. She does have a focal pancreatic tail cystic lesion 0.3 x 1.07 cm.   January 13, 2017 approx 6 week labs. The white cell count was 4.0 hemoglobin 14.2 platelet count was 187. Glucose was 81 BUN of 12 creatinine 0.74 sodium 142 potassium 4 albumin 3.2 bilirubin 0.8 alkaline phosphatase 91 AST of 40 ALT of 28 hep C less than 15 but detected. AFP of 1.7.   Stressed to pt the need for social distancing and strict handwashing and wearing a mask and to follow any other new or added CDC recommendations as this is an evolving target.  plan: 1. Check on the u.s that she did. 2. She will see us in  and u.s same day. 3. Needs to work on diet and follow course. 4. She is due for colonoscopy and to do with Dr Hui. 5. She will let us know if issues.   Duration of visit was  30 min of this face to face visit with over 50% of the time explaining the patient's condition and treatment plan.

## 2021-07-29 NOTE — EXAM-PHYSICAL EXAM
Gen: awake and responsive. Eyes: anicteric, normal lids. Mouth: covered with mask. Nose: covered with mask. Hearing: intact grossly. Neck: trachea midline and no jvd. CV: RRR no s3. Lungs: clear. No wheezes, Abd: Soft, nabs, NR, NT. No hsm. ExtL no sig edema, slight palm erythema. Neuro: moves all 4 ext grossly. No asterixis. Skin: no pruritis and some palm erythema.

## 2021-07-30 ENCOUNTER — TELEPHONE ENCOUNTER (OUTPATIENT)
Dept: URBAN - METROPOLITAN AREA CLINIC 92 | Facility: CLINIC | Age: 71
End: 2021-07-30

## 2021-07-30 ENCOUNTER — LAB OUTSIDE AN ENCOUNTER (OUTPATIENT)
Dept: URBAN - METROPOLITAN AREA CLINIC 92 | Facility: CLINIC | Age: 71
End: 2021-07-30

## 2021-07-30 PROBLEM — 79131000119100: Status: ACTIVE | Noted: 2021-07-30

## 2021-07-30 PROBLEM — 429087003: Status: ACTIVE | Noted: 2020-07-25

## 2021-07-30 PROBLEM — 128302006 CHRONIC HEPATITIS C: Status: ACTIVE | Noted: 2021-07-29

## 2021-07-30 PROBLEM — 274527008: Status: ACTIVE | Noted: 2021-07-30

## 2021-07-30 NOTE — HPI-TODAY'S VISIT:
Dear Aidee Amato, July 29 final ultrasound shows the liver to be coarsened.  Multiple liver cysts seen measuring up to 1.5 cm in the left lobe.  No discrete liver lesions seen. Sludge is seen in the gallbladder but no gallbladder wall thickening or pericholecystic fluid.  Common bile duct normal at 4.9 mm.  Pancreas normal were seen. Right kidney 10.5 cm with a 1.8 cm hyperechoic lesion seen in the right kidney that was not seen in a prior older scan from 2011 that they had for comparison. Spleen 8.6 cm.  Liver vessels patent.  No ascites seen. They mention that this kidney lesion could represent an angiomyolipoma but they could not be sure and they recommended a MRI to assess this as this was a new finding that was seen. Spoke with pt to get the mri done at [facility]. Dr Wade

## 2021-08-31 ENCOUNTER — OFFICE VISIT (OUTPATIENT)
Dept: URBAN - METROPOLITAN AREA SURGERY CENTER 16 | Facility: SURGERY CENTER | Age: 71
End: 2021-08-31
Payer: MEDICARE

## 2021-08-31 ENCOUNTER — CLAIMS CREATED FROM THE CLAIM WINDOW (OUTPATIENT)
Dept: URBAN - METROPOLITAN AREA CLINIC 4 | Facility: CLINIC | Age: 71
End: 2021-08-31
Payer: MEDICARE

## 2021-08-31 DIAGNOSIS — D12.3 ADENOMA OF TRANSVERSE COLON: ICD-10-CM

## 2021-08-31 DIAGNOSIS — D12.3 BENIGN NEOPLASM OF TRANSVERSE COLON: ICD-10-CM

## 2021-08-31 DIAGNOSIS — D12.2 ADENOMA OF ASCENDING COLON: ICD-10-CM

## 2021-08-31 DIAGNOSIS — D12.2 BENIGN NEOPLASM OF ASCENDING COLON: ICD-10-CM

## 2021-08-31 PROCEDURE — 45380 COLONOSCOPY AND BIOPSY: CPT | Performed by: INTERNAL MEDICINE

## 2021-08-31 PROCEDURE — 88305 TISSUE EXAM BY PATHOLOGIST: CPT | Performed by: PATHOLOGY

## 2021-08-31 PROCEDURE — G8907 PT DOC NO EVENTS ON DISCHARG: HCPCS | Performed by: INTERNAL MEDICINE

## 2021-12-17 PROBLEM — 708198006: Status: ACTIVE | Noted: 2020-07-25

## 2022-01-03 ENCOUNTER — LAB OUTSIDE AN ENCOUNTER (OUTPATIENT)
Dept: URBAN - METROPOLITAN AREA CLINIC 86 | Facility: CLINIC | Age: 72
End: 2022-01-03

## 2022-01-20 ENCOUNTER — LAB OUTSIDE AN ENCOUNTER (OUTPATIENT)
Dept: URBAN - METROPOLITAN AREA CLINIC 86 | Facility: CLINIC | Age: 72
End: 2022-01-20

## 2022-02-02 ENCOUNTER — TELEPHONE ENCOUNTER (OUTPATIENT)
Dept: URBAN - METROPOLITAN AREA CLINIC 86 | Facility: CLINIC | Age: 72
End: 2022-02-02

## 2022-02-08 ENCOUNTER — OFFICE VISIT (OUTPATIENT)
Dept: URBAN - METROPOLITAN AREA CLINIC 91 | Facility: CLINIC | Age: 72
End: 2022-02-08

## 2022-02-08 ENCOUNTER — OFFICE VISIT (OUTPATIENT)
Dept: URBAN - METROPOLITAN AREA CLINIC 86 | Facility: CLINIC | Age: 72
End: 2022-02-08

## 2022-04-06 ENCOUNTER — TELEPHONE ENCOUNTER (OUTPATIENT)
Dept: URBAN - METROPOLITAN AREA CLINIC 86 | Facility: CLINIC | Age: 72
End: 2022-04-06